# Patient Record
Sex: FEMALE | Race: BLACK OR AFRICAN AMERICAN | NOT HISPANIC OR LATINO | Employment: FULL TIME | URBAN - METROPOLITAN AREA
[De-identification: names, ages, dates, MRNs, and addresses within clinical notes are randomized per-mention and may not be internally consistent; named-entity substitution may affect disease eponyms.]

---

## 2023-03-27 ENCOUNTER — OFFICE VISIT (OUTPATIENT)
Dept: URGENT CARE | Facility: CLINIC | Age: 38
End: 2023-03-27
Payer: MEDICAID

## 2023-03-27 VITALS
HEIGHT: 61 IN | DIASTOLIC BLOOD PRESSURE: 83 MMHG | OXYGEN SATURATION: 100 % | WEIGHT: 267 LBS | BODY MASS INDEX: 50.41 KG/M2 | TEMPERATURE: 100 F | SYSTOLIC BLOOD PRESSURE: 148 MMHG | HEART RATE: 80 BPM | RESPIRATION RATE: 20 BRPM

## 2023-03-27 DIAGNOSIS — J02.9 SORE THROAT: Primary | ICD-10-CM

## 2023-03-27 DIAGNOSIS — J06.9 UPPER RESPIRATORY TRACT INFECTION, UNSPECIFIED TYPE: ICD-10-CM

## 2023-03-27 DIAGNOSIS — R09.89 SYMPTOMS OF UPPER RESPIRATORY INFECTION (URI): ICD-10-CM

## 2023-03-27 PROCEDURE — 99204 OFFICE O/P NEW MOD 45 MIN: CPT | Mod: PBBFAC | Performed by: FAMILY MEDICINE

## 2023-03-27 PROCEDURE — 0240U COVID/FLU A&B PCR: CPT | Performed by: FAMILY MEDICINE

## 2023-03-27 PROCEDURE — 99214 PR OFFICE/OUTPT VISIT, EST, LEVL IV, 30-39 MIN: ICD-10-PCS | Mod: S$PBB,,, | Performed by: FAMILY MEDICINE

## 2023-03-27 PROCEDURE — 99214 OFFICE O/P EST MOD 30 MIN: CPT | Mod: S$PBB,,, | Performed by: FAMILY MEDICINE

## 2023-03-27 RX ORDER — NORETHINDRONE 5 MG/1
TABLET ORAL
COMMUNITY
Start: 2023-01-24

## 2023-03-27 RX ORDER — LORAZEPAM 0.5 MG/1
0.5 TABLET ORAL 2 TIMES DAILY PRN
COMMUNITY
Start: 2022-11-09

## 2023-03-27 RX ORDER — ATOMOXETINE 40 MG/1
40 CAPSULE ORAL EVERY MORNING
COMMUNITY
Start: 2023-03-05

## 2023-03-27 RX ORDER — ASPIRIN 325 MG
50000 TABLET, DELAYED RELEASE (ENTERIC COATED) ORAL
COMMUNITY
Start: 2022-12-07

## 2023-03-27 RX ORDER — PREDNISONE 20 MG/1
20 TABLET ORAL DAILY
Qty: 5 TABLET | Refills: 0 | Status: SHIPPED | OUTPATIENT
Start: 2023-03-27 | End: 2023-04-01

## 2023-03-27 RX ORDER — CYANOCOBALAMIN 1000 UG/ML
INJECTION, SOLUTION INTRAMUSCULAR; SUBCUTANEOUS
COMMUNITY
Start: 2023-03-04

## 2023-03-27 RX ORDER — DULOXETIN HYDROCHLORIDE 60 MG/1
60 CAPSULE, DELAYED RELEASE ORAL
COMMUNITY
Start: 2023-03-05

## 2023-03-27 RX ORDER — FERROUS SULFATE 325(65) MG
TABLET ORAL 2 TIMES DAILY
COMMUNITY
Start: 2023-03-06

## 2023-03-27 RX ORDER — ZOLPIDEM TARTRATE 10 MG/1
TABLET ORAL
COMMUNITY
Start: 2022-08-10

## 2023-03-27 RX ORDER — DOCUSATE SODIUM 100 MG/1
100 CAPSULE, LIQUID FILLED ORAL DAILY PRN
COMMUNITY
Start: 2023-01-24

## 2023-03-28 ENCOUNTER — TELEPHONE (OUTPATIENT)
Dept: URGENT CARE | Facility: CLINIC | Age: 38
End: 2023-03-28
Payer: MEDICAID

## 2023-03-28 LAB
FLUAV AG UPPER RESP QL IA.RAPID: NOT DETECTED
FLUBV AG UPPER RESP QL IA.RAPID: NOT DETECTED
SARS-COV-2 RNA RESP QL NAA+PROBE: DETECTED

## 2023-03-28 NOTE — TELEPHONE ENCOUNTER
----- Message from Mandeep Schultz MD sent at 3/28/2023  9:10 AM CDT -----  Please notify patient of positive COVID test.  Review COVID-19 instructions.  I understand she was prescribed prednisone by another provider.  However, I would recommend she stop prednisone as it is currently not indicated in early COVID.  Use over-the-counter medications for her symptoms.  Review ER precautions.    Thanks

## 2023-03-28 NOTE — PROGRESS NOTES
"Subjective:       Patient ID: Izabella Alexis is a 37 y.o. female.    Chief Complaint: Cough, Sore Throat, Nasal Congestion, Headache, Chest Pain, Generalized Body Aches (X 2 days), and Fever      HPI  37-year-old female with cough, sore throat, nasal congestion, headache, chest pain, body aches, and fever for 2 days.  Over-the-counter medications ineffective.  No known sick contacts.  Review of Systems   Constitutional:         As above   HENT:          As above   Respiratory:          As above       Objective:       Vital Signs  Temp: 100 °F (37.8 °C)  Temp Source: Oral  Pulse: 80  Resp: 20  SpO2: 100 %  BP: (!) 148/83  Pain Score: 10-Worst pain ever  Pain Loc: Generalized  Height and Weight  Height: 5' 1" (154.9 cm)  Weight: 121.1 kg (267 lb)  BSA (Calculated - sq m): 2.28 sq meters  BMI (Calculated): 50.5  Weight in (lb) to have BMI = 25: 132]  Physical Exam  Constitutional:       Appearance: Normal appearance.   HENT:      Head: Normocephalic and atraumatic.      Nose: Congestion present. No rhinorrhea.      Mouth/Throat:      Pharynx: Posterior oropharyngeal erythema present. No oropharyngeal exudate.   Eyes:      Extraocular Movements: Extraocular movements intact.      Conjunctiva/sclera: Conjunctivae normal.   Cardiovascular:      Rate and Rhythm: Normal rate and regular rhythm.      Heart sounds: Normal heart sounds.   Pulmonary:      Breath sounds: Normal breath sounds.   Musculoskeletal:      Cervical back: Tenderness present.   Lymphadenopathy:      Cervical: Cervical adenopathy present.   Skin:     General: Skin is warm and dry.   Neurological:      General: No focal deficit present.      Mental Status: She is alert.   Psychiatric:         Mood and Affect: Mood and affect normal.         Speech: Speech normal.         Behavior: Behavior normal. Behavior is cooperative.         Thought Content: Thought content does not include homicidal or suicidal ideation.       Assessment:       Problem List Items " Addressed This Visit    None  Visit Diagnoses       Sore throat    -  Primary    Relevant Orders    Strep Group A by PCR    Symptoms of upper respiratory infection (URI)        Upper respiratory tract infection, unspecified type        Relevant Medications    predniSONE (DELTASONE) 20 MG tablet    Other Relevant Orders    COVID/FLU A&B PCR              Plan:    Encouraged antihistamines as needed  Encouraged ibuprofen/acetaminophen as needed  ER precautions  Follow-up with PCP